# Patient Record
Sex: MALE | Race: WHITE | NOT HISPANIC OR LATINO | Employment: FULL TIME | URBAN - NONMETROPOLITAN AREA
[De-identification: names, ages, dates, MRNs, and addresses within clinical notes are randomized per-mention and may not be internally consistent; named-entity substitution may affect disease eponyms.]

---
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
## 2020-10-07 ENCOUNTER — VIRTUAL VISIT (OUTPATIENT)
Dept: FAMILY MEDICINE | Facility: OTHER | Age: 20
End: 2020-10-07
Attending: INTERNAL MEDICINE

## 2020-10-07 DIAGNOSIS — Z20.822 EXPOSURE TO COVID-19 VIRUS: Primary | ICD-10-CM

## 2020-10-07 PROCEDURE — 99212 OFFICE O/P EST SF 10 MIN: CPT | Mod: 95 | Performed by: PHYSICIAN ASSISTANT

## 2020-10-07 ASSESSMENT — PAIN SCALES - GENERAL: PAINLEVEL: NO PAIN (0)

## 2020-10-07 ASSESSMENT — PATIENT HEALTH QUESTIONNAIRE - PHQ9: SUM OF ALL RESPONSES TO PHQ QUESTIONS 1-9: 0

## 2020-10-07 NOTE — NURSING NOTE
"Chief Complaint   Patient presents with     Covid 19 Testing     exposure       Initial There were no vitals taken for this visit. Estimated body mass index is 43.07 kg/m  as calculated from the following:    Height as of 10/30/18: 1.745 m (5' 8.7\").    Weight as of 10/30/18: 131.1 kg (289 lb 1.6 oz).  Medication Reconciliation: complete    Mackenzie Salinas LPN  "

## 2020-10-07 NOTE — PROGRESS NOTES
"Myles Espinoza is a 20 year old male who is being evaluated via a billable telephone visit.      The patient has been notified of following:     \"This telephone visit will be conducted via a call between you and your physician/provider. We have found that certain health care needs can be provided without the need for a physical exam.  This service lets us provide the care you need with a short phone conversation.  If a prescription is necessary we can send it directly to your pharmacy.  If lab work is needed we can place an order for that and you can then stop by our lab to have the test done at a later time.    Telephone visits are billed at different rates depending on your insurance coverage. During this emergency period, for some insurers they may be billed the same as an in-person visit.  Please reach out to your insurance provider with any questions.    If during the course of the call the physician/provider feels a telephone visit is not appropriate, you will not be charged for this service.\"    Patient has given verbal consent for Telephone visit?  Yes    What phone number would you like to be contacted at? [phone]    How would you like to obtain your AVS? Mail a copy    Subjective     Myles Espinoza is a 20 year old male who presents via phone visit today for the following health issues:    HPI  Patient has a known exposure to person with positive test positive for COVID-19.  He denies any symptoms such as new cough, shortness of breath, fevers, chills, night sweats, new rash, or loss of taste or smell.  He does have history of pneumonia 2 years ago and states he has a mild cough that has persisted since then.  He states his cough is not changed at all.    Review of Systems   Constitutional, HEENT, cardiovascular, pulmonary, gi and gu systems are negative, except as otherwise noted.    Objective   Vitals - Patient Reported  Pain Score: No Pain (0)    healthy, alert and no distress  PSYCH: Alert and " oriented times 3; coherent speech, normal   rate and volume, able to articulate logical thoughts, able   to abstract reason, no tangential thoughts, no hallucinations   or delusions  His affect is normal  RESP: No cough, no audible wheezing, able to talk in full sentences  Remainder of exam unable to be completed due to telephone visits    Assessment & Plan     Myles was seen today for covid 19 testing.    Diagnoses and all orders for this visit:    Exposure to COVID-19 virus      He does have a tonic cough for the past 2 years that is unchanged.  If this does change I would like to see him back reevaluation possibly in the clinic if he is COVID-19 negative.  He understands this and will follow-up as needed.    No follow-ups on file.    ANIKET Altman  [facility]    Phone call duration:  5 minutes

## 2021-02-01 ENCOUNTER — OFFICE VISIT (OUTPATIENT)
Dept: FAMILY MEDICINE | Facility: OTHER | Age: 21
End: 2021-02-01
Attending: NURSE PRACTITIONER

## 2021-02-01 VITALS
SYSTOLIC BLOOD PRESSURE: 130 MMHG | RESPIRATION RATE: 18 BRPM | WEIGHT: 315 LBS | TEMPERATURE: 98.5 F | HEART RATE: 85 BPM | OXYGEN SATURATION: 97 % | DIASTOLIC BLOOD PRESSURE: 72 MMHG | BODY MASS INDEX: 48.03 KG/M2

## 2021-02-01 DIAGNOSIS — H00.021 HORDEOLUM INTERNUM OF RIGHT UPPER EYELID: Primary | ICD-10-CM

## 2021-02-01 DIAGNOSIS — H02.89 PAIN AND SWELLING OF UPPER EYELID OF RIGHT EYE: ICD-10-CM

## 2021-02-01 DIAGNOSIS — H02.841 PAIN AND SWELLING OF UPPER EYELID OF RIGHT EYE: ICD-10-CM

## 2021-02-01 PROCEDURE — 99213 OFFICE O/P EST LOW 20 MIN: CPT | Performed by: NURSE PRACTITIONER

## 2021-02-01 RX ORDER — ERYTHROMYCIN 5 MG/G
0.5 OINTMENT OPHTHALMIC 4 TIMES DAILY
Qty: 3.5 G | Refills: 0 | Status: SHIPPED | OUTPATIENT
Start: 2021-02-01 | End: 2022-05-21

## 2021-02-01 ASSESSMENT — PAIN SCALES - GENERAL: PAINLEVEL: NO PAIN (0)

## 2021-02-01 NOTE — NURSING NOTE
"Chief Complaint   Patient presents with     Eye Problem     right eye     Patient is here for swelling of the right eye that started 3 days ago. Patient states he had pain in it 3 days ago but no longer does.   VISION   Patient states he is supposed to wear glasses, not worn.  Tool used: Urrutia   Right eye:        10/32 (20/63)  Left eye:          10/25 (20/50)    Initial /72   Pulse 85   Temp 98.5  F (36.9  C) (Tympanic)   Resp 18   Wt 146.2 kg (322 lb 6.4 oz)   SpO2 97%   BMI 48.03 kg/m   Estimated body mass index is 48.03 kg/m  as calculated from the following:    Height as of 10/30/18: 1.745 m (5' 8.7\").    Weight as of this encounter: 146.2 kg (322 lb 6.4 oz).  Medication Reconciliation: complete    Marija Tavera LPN  "

## 2021-02-01 NOTE — PROGRESS NOTES
HPI:    Myles Espinoza is a 20 year old male  who presents to [facility] today for eyelid swelling    States he woke up 3 days ago with right upper eyelid swelling and pain.  No eyeball pain.  Yesterday burning in eye.  No irritated or scratchy feeling in eye.  No known foreign body.  Some watery drainage.  No crusting.  No vision change or blurry vision.  He is suppose to be wearing glasses but states he doesn't wear them because he lost them and hasn't replaced them.  Wears glasses at work.  No fevers or chills.  No runny or stuffy nose.  Headache the initial day, none since.  No cough.  No sore throat.  Tried hot compress this morning only.  Taking Ibuprofen 2 tabs every 6 hours.        Past Medical History:   Diagnosis Date     Anxiety disorder     anxiety NOS     Attention-deficit hyperactivity disorder     combined type,off medication as of .     Body mass index (BMI) of 40.0-44.9 in adult (H)     2015,BMI of 44.     Major depressive disorder, single episode     No Comments Provided     Prediabetes     2015     Pure hyperglyceridemia     2015     Vitamin D deficiency     2015     History reviewed. No pertinent surgical history.  Social History     Tobacco Use     Smoking status: Current Every Day Smoker     Packs/day: 0.25     Types: Cigarettes     Last attempt to quit: 2018     Years since quittin.3     Smokeless tobacco: Former User     Types: Chew     Tobacco comment: thinking about it   Substance Use Topics     Alcohol use: Yes     Alcohol/week: 0.0 standard drinks     Comment: rare     No current outpatient medications on file.     Allergies   Allergen Reactions     Liquid Adhesive Rash     Used with steri strips           Past medical history, past surgical history, current medications and allergies reviewed and accurate to the best of my knowledge.        ROS:  Refer to HPI    /72   Pulse 85   Temp 98.5  F (36.9  C) (Tympanic)   Resp 18   Wt 146.2 kg (322 lb  6.4 oz)   SpO2 97%   BMI 48.03 kg/m      EXAM:  General Appearance: Well appearing male adolescent, non ill appearance, appropriate appearance for age. No acute distress  Eyes: Left palpebral conjunctivae with bright erythema and localized swelling with scant purulent drainage from lateral upper eyelid, left bulbar conjunctivae normal without erythema or irritation, cornea clear, no hypopyon, no hyphema, no subconjunctival hemorrhage, no drainage or crusting.  Right upper lateral eyelid with mild erythema, tenderness and mild swelling, no lower eyelid swelling.  Right palpebral and bulbar conjunctivae without erythema or irritation, cornea clear, no drainage or crusting.  Left eyelid without swelling or erythema.  PERRLA.  NO periorbital tenderness to palpation.    Orophayrnx: voice clear.    Sinuses:  No sinus tenderness upon palpation of the frontal or maxillary sinuses  Nose:  No noted drainage or congestion   Respiratory: normal chest wall and respirations.  Normal effort.  No cough appreciated.  Musculoskeletal:  Equal movement of bilateral upper extremities.  Equal movement of bilateral lower extremities.  Normal gait.   Psychological: normal affect, alert, oriented, and pleasant.         ASSESSMENT/PLAN:    I have reviewed the nursing notes.  I have reviewed the findings, diagnosis, plan and need for follow up with the patient.    1. Pain and swelling of upper eyelid of right eye  2. Hordeolum internum of right upper eyelid    - erythromycin (ROMYCIN) 5 MG/GM ophthalmic ointment; Place 0.5 inches into the right eye 4 times daily  Dispense: 3.5 g; Refill: 0    Right lateral upper eyelid with visible erythema, swelling and internal stye with active purulent drainage.  No clinical indications of periorbital cellulitis.      Treat with warm compresses for 10 to 15 minutes 4 to 6 times daily and Erythromycin eye ointment QID.    Discussed warning signs/symptoms indicative of need to f/u  Follow up if symptoms  persist or worsen or concerns      I explained my diagnostic considerations and recommendations to the patient, who voiced understanding and agreement with the treatment plan. All questions were answered. We discussed potential side effects of any prescribed or recommended therapies, as well as expectations for response to treatments.    Disclaimer:  This note consists of words and symbols derived from keyboarding, dictation, or using voice recognition software. As a result, there may be errors in the script that have gone undetected. Please consider this when interpreting information found in this note.

## 2022-05-21 ENCOUNTER — OFFICE VISIT (OUTPATIENT)
Dept: FAMILY MEDICINE | Facility: OTHER | Age: 22
End: 2022-05-21
Attending: NURSE PRACTITIONER

## 2022-05-21 ENCOUNTER — TELEPHONE (OUTPATIENT)
Dept: FAMILY MEDICINE | Facility: OTHER | Age: 22
End: 2022-05-21

## 2022-05-21 VITALS
HEART RATE: 99 BPM | TEMPERATURE: 97.9 F | BODY MASS INDEX: 47.09 KG/M2 | SYSTOLIC BLOOD PRESSURE: 132 MMHG | WEIGHT: 315 LBS | RESPIRATION RATE: 18 BRPM | DIASTOLIC BLOOD PRESSURE: 80 MMHG | OXYGEN SATURATION: 97 %

## 2022-05-21 DIAGNOSIS — R05.9 COUGH: ICD-10-CM

## 2022-05-21 DIAGNOSIS — R52 BODY ACHES: ICD-10-CM

## 2022-05-21 DIAGNOSIS — U07.1 INFECTION DUE TO 2019 NOVEL CORONAVIRUS: Primary | ICD-10-CM

## 2022-05-21 LAB — SARS-COV-2 RNA RESP QL NAA+PROBE: POSITIVE

## 2022-05-21 PROCEDURE — C9803 HOPD COVID-19 SPEC COLLECT: HCPCS

## 2022-05-21 PROCEDURE — U0005 INFEC AGEN DETEC AMPLI PROBE: HCPCS | Mod: ZL | Performed by: PHYSICIAN ASSISTANT

## 2022-05-21 PROCEDURE — 99213 OFFICE O/P EST LOW 20 MIN: CPT | Performed by: PHYSICIAN ASSISTANT

## 2022-05-21 ASSESSMENT — PAIN SCALES - GENERAL: PAINLEVEL: MODERATE PAIN (5)

## 2022-05-21 NOTE — PROGRESS NOTES
"ASSESSMENT/PLAN:     I have reviewed the nursing notes.  I have reviewed the findings, diagnosis, plan and need for follow up with the patient.    22 yo male with headache, body aches, fever with symptoms about 24 hours. Positive home covid test. Wanted clinic test to verify. Discussed symptomatic treatments and quarantine per CDC guidelines. Return to clinic for difficulty breathing.     1. Infection due to 2019 novel coronavirus    2. Headache  3. Cough  4. Body aches    - Symptomatic; Unknown COVID-19 Virus (Coronavirus) by PCR Nose        I explained my diagnostic considerations and recommendations to the patient, who voiced understanding and agreement with the treatment plan. All questions were answered. We discussed potential side effects of any prescribed or recommended therapies, as well as expectations for response to treatments.    Rolanda Martinez PA-C  [facility]          Nursing Notes:   Kellie Haddad LPN  2022  1:04 PM  Signed  Chief Complaint   Patient presents with     Fever     Fever, body aches, diarrhea, headache, fatigue     Patient stated in room that he took a Covid home test yesterday tht was positive, but states that it was .    Initial /80 (BP Location: Right arm, Patient Position: Sitting, Cuff Size: Adult Large)   Pulse 99   Temp 97.9  F (36.6  C) (Tympanic)   Resp 18   Wt 143.4 kg (316 lb 1.6 oz)   SpO2 97%   BMI 47.09 kg/m   Estimated body mass index is 47.09 kg/m  as calculated from the following:    Height as of 10/30/18: 1.745 m (5' 8.7\").    Weight as of this encounter: 143.4 kg (316 lb 1.6 oz).     Medication Reconciliation: complete      FOOD SECURITY SCREENING QUESTIONS:    The next two questions are to help us understand your food security.  If you are feeling you need any assistance in this area, we have resources available to support you today.    Hunger Vital Signs:  Within the past 12 months we worried whether our food would run out " before we got money to buy more. Never  Within the past 12 months the food we bought just didn't last and we didn't have money to get more. Never      Advance care plan reviewed      Kellie Haddad LPN on 5/21/2022 at 1:00 PM           SUBJECTIVE:   Myles Espinoza is a 21 year old male who presents to clinic today for evaluation of headache, body aches, fever  Onset: past 24 hours  Course unchanged  Associated symptoms mild occasional cough, headache, body aches, fever, diarrhea  Treatments: tylenol     Denies:  chest pain, shortness of breath, wheezing    Eating and drinking normal  Normal urine   No skin rash     No prior history of asthma/COPD  He had a prior pneumonia a few years ago  Current smoker - 1 ppd  Exposures: not known        Past Medical History:   Diagnosis Date     Anxiety disorder     anxiety NOS     Attention-deficit hyperactivity disorder     combined type,off medication as of 2013.     Body mass index (BMI) of 40.0-44.9 in adult (H)     9/9/2015,BMI of 44.     Major depressive disorder, single episode     No Comments Provided     Prediabetes     9/18/2015     Pure hyperglyceridemia     9/18/2015     Vitamin D deficiency     9/18/2015     History reviewed. No pertinent surgical history.  Social History     Tobacco Use     Smoking status: Current Every Day Smoker     Packs/day: 0.25     Types: Cigarettes     Last attempt to quit: 9/22/2018     Years since quitting: 3.6     Smokeless tobacco: Former User     Types: Chew     Tobacco comment: thinking about it   Substance Use Topics     Alcohol use: Yes     Alcohol/week: 0.0 standard drinks     Comment: rare     Current Outpatient Medications   Medication Sig Dispense Refill     erythromycin (ROMYCIN) 5 MG/GM ophthalmic ointment Place 0.5 inches into the right eye 4 times daily 3.5 g 0     Allergies   Allergen Reactions     Liquid Adhesive Rash     Used with steri strips           Past medical history, past surgical history, current medications and  allergies reviewed and accurate to the best of my knowledge.          OBJECTIVE:     /80 (BP Location: Right arm, Patient Position: Sitting, Cuff Size: Adult Large)   Pulse 99   Temp 97.9  F (36.6  C) (Tympanic)   Resp 18   Wt 143.4 kg (316 lb 1.6 oz)   SpO2 97%   BMI 47.09 kg/m    Body mass index is 47.09 kg/m .    General Appearance: Well appearing male,  No acute distress  Eyes: no injection, no tearing or drainage, eye lids normal  Ears: Canals and TM's normal   Orophayrnx: moist mucous membranes,   Nose: No sinus tendernesss  Neck: supple without adenopathy, no rigidity, normal ROM  Respiratory: normal respiration, no increased work of breathing, no stridor/retractions/nasal flaring. Lungs Clear to auscultation  Cardiac: RRR with no murmurs  Legs: no swelling or tenderness  Musculoskeletal:  Equal movement of bilateral upper extremities.  Equal movement of bilateral lower extremities.  Normal gait.   Dermatological: no rashes noted of exposed skin  Psychological: normal affect, alert, oriented, and pleasant.       Results for orders placed or performed in visit on 05/21/22   Symptomatic; Unknown COVID-19 Virus (Coronavirus) by PCR Nose     Status: Abnormal    Specimen: Nose; Swab   Result Value Ref Range    SARS CoV2 PCR Positive (A) Negative    Narrative    Testing was performed using the Xpert Xpress SARS-CoV-2 Assay on the   Cepheid Gene-Xpert Instrument Systems. Additional information about   this Emergency Use Authorization (EUA) assay can be found via the Lab   Guide. This test should be ordered for the detection of SARS-CoV-2 in   individuals who meet SARS-CoV-2 clinical and/or epidemiological   criteria. Test performance is unknown in asymptomatic patients. This   test is for in vitro diagnostic use under the FDA EUA for   laboratories certified under CLIA to perform high complexity testing.   This test has not been FDA cleared or approved. A negative result   does not rule out the presence  of PCR inhibitors in the specimen or   target RNA in concentration below the limit of detection for the   assay. The possibility of a false negative should be considered if   the patient's recent exposure or clinical presentation suggests   COVID-19. This test was validated by [facility]. This laboratory is certified under the [facility]  al Laboratory Improvement Amendments (CLIA) as qualified to perform high complex  ity clinical laboratory testing.

## 2022-05-21 NOTE — PATIENT INSTRUCTIONS
Suspect covid  Symptomatic treatments: rest, tylenol, ibuprofen, cough and cold medications as needed  Quarantine per CDC guidelines recommended    Return to clinic if symptoms persist/worsen or difficulty breathing

## 2022-05-21 NOTE — NURSING NOTE
"Chief Complaint   Patient presents with     Fever     Fever, body aches, diarrhea, headache, fatigue     Patient stated in room that he took a Covid home test yesterday tht was positive, but states that it was .    Initial /80 (BP Location: Right arm, Patient Position: Sitting, Cuff Size: Adult Large)   Pulse 99   Temp 97.9  F (36.6  C) (Tympanic)   Resp 18   Wt 143.4 kg (316 lb 1.6 oz)   SpO2 97%   BMI 47.09 kg/m   Estimated body mass index is 47.09 kg/m  as calculated from the following:    Height as of 10/30/18: 1.745 m (5' 8.7\").    Weight as of this encounter: 143.4 kg (316 lb 1.6 oz).     Medication Reconciliation: complete      FOOD SECURITY SCREENING QUESTIONS:    The next two questions are to help us understand your food security.  If you are feeling you need any assistance in this area, we have resources available to support you today.    Hunger Vital Signs:  Within the past 12 months we worried whether our food would run out before we got money to buy more. Never  Within the past 12 months the food we bought just didn't last and we didn't have money to get more. Never      Advance care plan reviewed      Kellie Haddad LPN on 2022 at 1:00 PM      "

## 2022-05-22 NOTE — TELEPHONE ENCOUNTER
Patient's mom wanted [facility] to get the medication for Covid Antibody.    Please call patient back.    Thank you,  Lisette Do on 5/21/2022 at 7:44 PM

## 2022-05-22 NOTE — TELEPHONE ENCOUNTER
Patient is requesting Paxlovid for covid 19. Patient has 2 risk factors: smoking and obesity. He has no medications.   Reviewed risks/benefits.  Patient would like to proceed. Informed him he would have to wear condoms for 3 months after treatment.  Medication sent to [facility]. Return to clinic if symptoms persist/worsen.     Rolanda Martinez PA-C

## 2022-09-26 ENCOUNTER — OFFICE VISIT (OUTPATIENT)
Dept: FAMILY MEDICINE | Facility: OTHER | Age: 22
End: 2022-09-26
Attending: PHYSICIAN ASSISTANT
Payer: COMMERCIAL

## 2022-09-26 VITALS
HEART RATE: 80 BPM | DIASTOLIC BLOOD PRESSURE: 88 MMHG | SYSTOLIC BLOOD PRESSURE: 152 MMHG | OXYGEN SATURATION: 96 % | RESPIRATION RATE: 16 BRPM | BODY MASS INDEX: 46.92 KG/M2 | TEMPERATURE: 98.2 F | WEIGHT: 315 LBS

## 2022-09-26 DIAGNOSIS — J01.00 SUBACUTE MAXILLARY SINUSITIS: Primary | ICD-10-CM

## 2022-09-26 DIAGNOSIS — H65.93 BILATERAL NON-SUPPURATIVE OTITIS MEDIA: ICD-10-CM

## 2022-09-26 PROCEDURE — 99213 OFFICE O/P EST LOW 20 MIN: CPT | Performed by: PHYSICIAN ASSISTANT

## 2022-09-26 ASSESSMENT — PAIN SCALES - GENERAL: PAINLEVEL: MODERATE PAIN (5)

## 2022-09-26 NOTE — PATIENT INSTRUCTIONS
You were prescribed an antibiotic, please take into consideration the following information:  - Take entire course of antibiotic even if you start to feel better.  - Antibiotics can cause stomach upset including nausea and diarrhea. Read your bottle or ask the pharmacist if antibiotic can be taken with food to help prevent nausea. If you have symptoms of diarrhea you can take an over-the-counter probiotic and/or increase foods with probiotics such as yogurt, [place], sauerkraut.  -Use caution in sunlight as can lead to increased risk of sunburn while on ABX (antibiotics).     Please refer to your AVS for follow up and pain/symptoms management recommendations (I.e.: medications, helpful conservative treatment modalities, appropriate follow up if need to a specialist or family practice, etc.). Please return to urgent care if your symptoms change or worsen.     Discharge instructions:  -If you were prescribed a medication(s), please take this as prescribed/directed  -Monitor your symptoms, if changing/worsening, return to UC/ER or PCP for follow up    Sinus Infection:   1. Dry out congestion with flonase (1spray in both nostrils 2x daily for 3-5 days) and pseudoephedrine (1-2 tabs every 4-6 hrs for 3-5 days) unless contraindicated     2. Antihistamine such as Claritin or Zyrtec, etc. Generic brands are OK as well.     3. Use a saline spray/Neti Pot/sinus flush (Richard Med Sinus Rinse) 2-3 times daily to irrigate sinuses/mucosal tissue. This dilutes and moves secretions.     4. Tylenol or ibuprofen for pain and fevers - alternate every 4 hours as needed. I.e.: Ibuprofen at 8am, Tylenol 12pm, Ibuprofen 4pm    -Daily maximum of Tylenol is 4000mg (recommend staying under 3000mg)   -Daily maximum of Ibuprofen is 3200 mg    5. Plenty of fluids and rest as needed.     6. Chew, yawn and speak to help eustachian tubes drain.     * If you are a smoker, try to quit *     - Consider the following over-the-counter products if you are  older than 1 year and not pregnant: honey/chestal for cough relief and sambucus/elderberry for viral upper-respiratory symptoms.

## 2022-09-26 NOTE — PROGRESS NOTES
ASSESSMENT/PLAN:    I have reviewed the nursing notes.  I have reviewed the findings, diagnosis, plan and need for follow up with the patient.    1. Subacute maxillary sinusitis  - amoxicillin-clavulanate (AUGMENTIN) 875-125 MG tablet; Take 1 tablet by mouth 2 times daily for 10 days  Dispense: 20 tablet; Refill: 0  - Vital signs stable. PE consistent with sinusitis. Discussed with patient that we recommend: to dry out congestion with flonase (1spray in both nostrils 2x daily for 3-5 days) and pseudoephedrine (1-2 tabs every 4-6 hrs for 3-5 days) unless contraindicated, use a saline spray/Neti Pot/sinus flush (Richard Med Sinus Rinse) 2-3 times daily to irrigate sinuses/mucosal tissue. This dilutes and moves secretions. Alternate Tylenol or ibuprofen for pain and fevers - alternate every 4 hours as needed. Recommend plenty of fluids and rest as needed. Discussed that if a smoker, tobacco cessation recommended. Discussed that we normally do not treat sinus infections of less than 10 days duration with oral antibiotics as these are typically viral in nature. Discussed that if an antibiotic was prescribed today for bacterial sinusitis to take the entire course of antibiotic, discussed side effect profile of prescribed medications. Patient is in agreement and understanding of the above treatment plan. All questions and concerns were addressed and answered to patient's satisfaction. AVS reviewed with patient.     2. Bilateral non-suppurative otitis media  - amoxicillin-clavulanate (AUGMENTIN) 875-125 MG tablet; Take 1 tablet by mouth 2 times daily for 10 days  Dispense: 20 tablet; Refill: 0  -  PE consistent with OME/ear infection of bilateral ear(s). Treatment of choice includes antibiotic regimen (Augmentin, alternating tylenol and ibuprofen every 4-6 hours as needed (if able, daily limits reviewed in AVS and verbally with patient), warm compresses, other symptomatic remedies. Avoid trauma to ear(s) such as Q-tips. If  symptoms change or worsen, recommend follow up for reevaluation (high fevers, worsening pain, abnormal drainage or odor from ear, etc.).     Discussed warning signs/symptoms indicative of need to f/u    Follow up if symptoms persist or worsen or concerns    I explained my diagnostic considerations and recommendations to the patient, who voiced understanding and agreement with the treatment plan. All questions were answered. We discussed potential side effects of any prescribed or recommended therapies, as well as expectations for response to treatments.    Chelsea Skinner PA-C  9/26/2022  1:53 PM    HPI:    Myles Espinoza is a 22 year old male  who presents to [facility] today for concerns of URI, x 10 days    Symptoms:  No fevers or chills.  No sore throat/pharyngitis/tonsillitis.   Yes allergy/URI Symptoms  Yes Muffled Sounds/Change in Hearing - left sided  Yes Sensation of Fullness in Ear(s) - left sided  No Ringing in Ears/Tinnitus  No Balance Changes  No Dizziness  Yes Congestion (head/nasal/chest)  Yes Cough/Productive Cough  Yes Post Nasal Drip  Yes Headache  Yes Sinus Pain/Pressure  No Myalgias  Yes Otalgia  Activity Level Changes: Yes: tired  Appetite/Liquid Intake Changes: No  Changes to Bowel Habits: Yes: diarrhea  Changes to Bladder Habits: No    Treatments tried: OTC Cough med, Fluids and Rest. Dayquil no relief. Nyquil has helped with sleep.     Site of exposure: not known.  Type of exposure: not known    Vaccination status:   - Influenza: not immunized at this time  - COVID: not immunized at this time    Other Pertinent History: tobacco use - has cut down due to illness, prior 1/2-1 ppd    PCP: [facility]    Past Medical History:   Diagnosis Date     Anxiety disorder     anxiety NOS     Attention-deficit hyperactivity disorder     combined type,off medication as of 2013.     Body mass index (BMI) of 40.0-44.9 in adult (H)     9/9/2015,BMI of 44.     Major depressive disorder, single episode      No Comments Provided     Prediabetes     2015     Pure hyperglyceridemia     2015     Vitamin D deficiency     2015     History reviewed. No pertinent surgical history.  Social History     Tobacco Use     Smoking status: Current Every Day Smoker     Packs/day: 0.50     Types: Cigarettes     Last attempt to quit: 2018     Years since quittin.0     Smokeless tobacco: Former User     Types: Chew     Tobacco comment: thinking about it - has tried to stop   Substance Use Topics     Alcohol use: Yes     Alcohol/week: 0.0 standard drinks     Comment: rare     No current outpatient medications on file.     Allergies   Allergen Reactions     Liquid Adhesive Rash     Used with steri strips       Past medical history, past surgical history, current medications and allergies reviewed and accurate to the best of my knowledge.      ROS:  Refer to HPI    BP (!) 152/88 (BP Location: Right arm, Patient Position: Sitting, Cuff Size: Adult Large)   Pulse 80   Temp 98.2  F (36.8  C) (Tympanic)   Resp 16   Wt 142.9 kg (315 lb)   SpO2 96%   BMI 46.92 kg/m      EXAM:  General Appearance: Well appearing 22 year old male, appropriate appearance for age. No acute distress   Ears: Bilateral TM intact, erythematous, bulging, serous effusion.  Left auditory canal clear.  Right auditory canal clear.  Normal external ears, non tender.  Eyes: conjunctivae normal without erythema or irritation, corneas clear, no drainage or crusting, no eyelid swelling, pupils equal   Oropharynx: moist mucous membranes, posterior pharynx without erythema, tonsils symmetric, no erythema, no exudates or petechiae, no post nasal drip seen, no trismus, voice clear.    Sinuses:  Maxillary sinus tenderness and pressure bilaterally  Nose:  Bilateral nares: no erythema, no edema, no drainage or congestion   Neck: supple without adenopathy  Respiratory: normal chest wall and respirations.  Normal effort.  Clear to auscultation bilaterally, no  wheezing, crackles or rhonchi.  No increased work of breathing.  No cough appreciated.  Cardiac: RRR with no murmurs  Dermatological: no rashes noted of exposed skin  Psychological: normal affect, alert, oriented, and pleasant.     Labs:  None     Xray:  None

## 2022-09-26 NOTE — NURSING NOTE
Chief Complaint   Patient presents with     Nasal Congestion     Throat Problem     Cough     Headache     Medication Review Completed: complete    FOOD SECURITY SCREENING QUESTIONS:    The next two questions are to help us understand your food security.  If you are feeling you need any assistance in this area, we have resources available to support you today.    Hunger Vital Signs:  Within the past 12 months we worried whether our food would run out before we got money to buy more. Never  Within the past 12 months the food we bought just didn't last and we didn't have money to get more. Never    Fanny Cotton LPN

## 2024-08-06 ENCOUNTER — OFFICE VISIT (OUTPATIENT)
Dept: FAMILY MEDICINE | Facility: OTHER | Age: 24
End: 2024-08-06
Attending: PHYSICIAN ASSISTANT
Payer: COMMERCIAL

## 2024-08-06 VITALS
BODY MASS INDEX: 35.22 KG/M2 | WEIGHT: 246 LBS | HEART RATE: 88 BPM | HEIGHT: 70 IN | DIASTOLIC BLOOD PRESSURE: 98 MMHG | SYSTOLIC BLOOD PRESSURE: 168 MMHG | TEMPERATURE: 98.7 F | RESPIRATION RATE: 20 BRPM

## 2024-08-06 DIAGNOSIS — R79.89 ELEVATED LFTS: ICD-10-CM

## 2024-08-06 DIAGNOSIS — Z13.220 LIPID SCREENING: ICD-10-CM

## 2024-08-06 DIAGNOSIS — E66.1 CLASS 2 DRUG-INDUCED OBESITY WITH SERIOUS COMORBIDITY AND BODY MASS INDEX (BMI) OF 35.0 TO 35.9 IN ADULT: ICD-10-CM

## 2024-08-06 DIAGNOSIS — I10 ESSENTIAL HYPERTENSION: Primary | ICD-10-CM

## 2024-08-06 DIAGNOSIS — R73.03 PREDIABETES: ICD-10-CM

## 2024-08-06 DIAGNOSIS — E66.812 CLASS 2 DRUG-INDUCED OBESITY WITH SERIOUS COMORBIDITY AND BODY MASS INDEX (BMI) OF 35.0 TO 35.9 IN ADULT: ICD-10-CM

## 2024-08-06 LAB
ALBUMIN SERPL BCG-MCNC: 4.1 G/DL (ref 3.5–5.2)
ALP SERPL-CCNC: 58 U/L (ref 40–150)
ALT SERPL W P-5'-P-CCNC: 77 U/L (ref 0–70)
ANION GAP SERPL CALCULATED.3IONS-SCNC: 9 MMOL/L (ref 7–15)
AST SERPL W P-5'-P-CCNC: 51 U/L (ref 0–45)
ATRIAL RATE - MUSE: 83 BPM
BASOPHILS # BLD AUTO: 0 10E3/UL (ref 0–0.2)
BASOPHILS NFR BLD AUTO: 0 %
BILIRUB SERPL-MCNC: 0.2 MG/DL
BUN SERPL-MCNC: 14.9 MG/DL (ref 6–20)
CALCIUM SERPL-MCNC: 9.7 MG/DL (ref 8.8–10.4)
CHLORIDE SERPL-SCNC: 105 MMOL/L (ref 98–107)
CHOLEST SERPL-MCNC: 166 MG/DL
CREAT SERPL-MCNC: 0.95 MG/DL (ref 0.67–1.17)
DIASTOLIC BLOOD PRESSURE - MUSE: NORMAL MMHG
EGFRCR SERPLBLD CKD-EPI 2021: >90 ML/MIN/1.73M2
EOSINOPHIL # BLD AUTO: 0.2 10E3/UL (ref 0–0.7)
EOSINOPHIL NFR BLD AUTO: 2 %
ERYTHROCYTE [DISTWIDTH] IN BLOOD BY AUTOMATED COUNT: 12.3 % (ref 10–15)
GLUCOSE SERPL-MCNC: 127 MG/DL (ref 70–99)
HBA1C MFR BLD: 6 % (ref 4–6.2)
HCO3 SERPL-SCNC: 25 MMOL/L (ref 22–29)
HCT VFR BLD AUTO: 47.7 % (ref 40–53)
HDLC SERPL-MCNC: 38 MG/DL
HGB BLD-MCNC: 16.1 G/DL (ref 13.3–17.7)
IMM GRANULOCYTES # BLD: 0.1 10E3/UL
IMM GRANULOCYTES NFR BLD: 1 %
INTERPRETATION ECG - MUSE: NORMAL
LDLC SERPL CALC-MCNC: 75 MG/DL
LYMPHOCYTES # BLD AUTO: 3.8 10E3/UL (ref 0.8–5.3)
LYMPHOCYTES NFR BLD AUTO: 31 %
MCH RBC QN AUTO: 28.7 PG (ref 26.5–33)
MCHC RBC AUTO-ENTMCNC: 33.8 G/DL (ref 31.5–36.5)
MCV RBC AUTO: 85 FL (ref 78–100)
MONOCYTES # BLD AUTO: 0.9 10E3/UL (ref 0–1.3)
MONOCYTES NFR BLD AUTO: 7 %
NEUTROPHILS # BLD AUTO: 7 10E3/UL (ref 1.6–8.3)
NEUTROPHILS NFR BLD AUTO: 59 %
NONHDLC SERPL-MCNC: 128 MG/DL
NRBC # BLD AUTO: 0 10E3/UL
NRBC BLD AUTO-RTO: 0 /100
P AXIS - MUSE: 46 DEGREES
PLATELET # BLD AUTO: 276 10E3/UL (ref 150–450)
POTASSIUM SERPL-SCNC: 3.8 MMOL/L (ref 3.4–5.3)
PR INTERVAL - MUSE: 136 MS
PROT SERPL-MCNC: 7.2 G/DL (ref 6.4–8.3)
QRS DURATION - MUSE: 86 MS
QT - MUSE: 370 MS
QTC - MUSE: 434 MS
R AXIS - MUSE: 52 DEGREES
RBC # BLD AUTO: 5.61 10E6/UL (ref 4.4–5.9)
SODIUM SERPL-SCNC: 139 MMOL/L (ref 135–145)
SYSTOLIC BLOOD PRESSURE - MUSE: NORMAL MMHG
T AXIS - MUSE: 38 DEGREES
TRIGL SERPL-MCNC: 267 MG/DL
TSH SERPL DL<=0.005 MIU/L-ACNC: 1.06 UIU/ML (ref 0.3–4.2)
VENTRICULAR RATE- MUSE: 83 BPM
WBC # BLD AUTO: 11.9 10E3/UL (ref 4–11)

## 2024-08-06 PROCEDURE — 80061 LIPID PANEL: CPT | Mod: ZL | Performed by: PHYSICIAN ASSISTANT

## 2024-08-06 PROCEDURE — 84443 ASSAY THYROID STIM HORMONE: CPT | Mod: ZL | Performed by: PHYSICIAN ASSISTANT

## 2024-08-06 PROCEDURE — 85041 AUTOMATED RBC COUNT: CPT | Mod: ZL | Performed by: PHYSICIAN ASSISTANT

## 2024-08-06 PROCEDURE — 82374 ASSAY BLOOD CARBON DIOXIDE: CPT | Mod: ZL | Performed by: PHYSICIAN ASSISTANT

## 2024-08-06 PROCEDURE — 93000 ELECTROCARDIOGRAM COMPLETE: CPT | Performed by: INTERNAL MEDICINE

## 2024-08-06 PROCEDURE — 83036 HEMOGLOBIN GLYCOSYLATED A1C: CPT | Mod: ZL | Performed by: PHYSICIAN ASSISTANT

## 2024-08-06 PROCEDURE — 99214 OFFICE O/P EST MOD 30 MIN: CPT | Performed by: PHYSICIAN ASSISTANT

## 2024-08-06 PROCEDURE — 36415 COLL VENOUS BLD VENIPUNCTURE: CPT | Mod: ZL | Performed by: PHYSICIAN ASSISTANT

## 2024-08-06 RX ORDER — LOSARTAN POTASSIUM AND HYDROCHLOROTHIAZIDE 12.5; 5 MG/1; MG/1
1 TABLET ORAL DAILY
Qty: 30 TABLET | Refills: 1 | Status: SHIPPED | OUTPATIENT
Start: 2024-08-06

## 2024-08-06 ASSESSMENT — PAIN SCALES - GENERAL: PAINLEVEL: NO PAIN (0)

## 2024-08-06 NOTE — PROGRESS NOTES
Assessment & Plan       ICD-10-CM    1. Essential hypertension  I10 CBC and Differential     Comprehensive Metabolic Panel     TSH Reflex GH     EKG 12-lead, tracing only (Same Day)     Hemoglobin A1c     losartan-hydrochlorothiazide (HYZAAR) 50-12.5 MG tablet      2. Lipid screening  Z13.220 Lipid Panel      3. Class 2 drug-induced obesity with serious comorbidity and body mass index (BMI) of 35.0 to 35.9 in adult  E66.1     Z68.35       4. Elevated LFTs  R79.89 Comprehensive Metabolic Panel      5. Prediabetes  R73.03         Essential hypertension, initial blood pressure reading of 178/98, repeat blood pressure 168/98, these are both quite above goal.  Discussed tobacco cessation, lifestyle modifications including increased lean proteins, fruits, vegetables and goal exercise of 150 minutes of moderate exercise per week.  Weight loss would greatly benefit cardiopulmonary health.  Lab work was updated today: WBC count of 11.9, attributed to tobacco use.  Remainder of blood counts normal, hemoglobin 16.1 and hematocrit 47.7.  Nonfasting glucose 127, GFR 90, creatinine 0.95, potassium 3.8, LFTs elevated - see #4.  TSH normal 1.06.  A1c elevated at 6.0, indicating prediabetes. Recommend dual agent therapy due to degree of blood pressure elevation.  Hyzaar 50-12.5 mg daily was sent through to pharmacy. Discussed risks, benefits and side effects of medication at length with Myles. Recheck in 4-weeks. Recommend to recheck potassium and renal function at this time, along with hepatic function as outlined in #4.  Recommend to check blood pressures at home, reviewed recommendations and how to properly check blood pressure (relaxation, arms and legs uncrossed, feet flat on the floor, avoiding caffeine and tobacco intake within 30 to 60 minutes of checking blood pressure, etc.).  Non fasting lipid screening ordered today due to new diagnosis of prediabetes - cholesterol 166, triglycerides 267, HDL 38, LDL 75. Overall,  "other than triglyceride elevation and slight low HDL (exercise and weight loss first line), this is unremarkable. Recommend lifestyle modifications.   Class II obesity, as noted above, weight loss and lifestyle modifications would greatly benefit prediabetes and cardiopulmonary health.  If A1c is upward trend, would recommend further interventions.  LFTs elevated with AST of 51 and ALT 77, recommend recheck in 1 to 2 months.  If persistently high, may benefit from additional laboratory testing including hepatitis panel, viral testing panel (CMV, EBV, etc.) and ultrasound of right upper quadrant.  Prediabetes - as noted above, lifestyle modifications are first line goal/treatment. Recommend increased lean proteins, fruits and vegetable intake. Goal exercise 150 minutes of moderate exercise per week (walking is great exercise). Weight loss would benefit cardiopulmonary and overall health.     Nicotine/Tobacco Cessation  He reports that he has been smoking cigarettes. He has quit using smokeless tobacco.  His smokeless tobacco use included chew.  Nicotine/Tobacco Cessation Plan  Information offered: Patient not interested at this time    BMI  Estimated body mass index is 35.81 kg/m  as calculated from the following:    Height as of this encounter: 1.765 m (5' 9.5\").    Weight as of this encounter: 111.6 kg (246 lb).   Weight management plan: Discussed healthy diet and exercise guidelines    See Patient Instructions    Return in about 4 weeks (around 9/3/2024) for Medication follow up.    Nori Bueno is a 24 year old, presenting for the following health issues:  Hypertension and Swelling        8/6/2024     3:15 PM   Additional Questions   Roomed by donte black lpn   Accompanied by s/o     History of Present Illness       Reason for visit:  Swelling and havent been to a dr appoinment in years.    He eats 0-1 servings of fruits and vegetables daily.He consumes 4 sweetened beverage(s) daily.He exercises with enough " effort to increase his heart rate 60 or more minutes per day.  He exercises with enough effort to increase his heart rate 5 days per week.   He is taking medications regularly.     Myles presents to the clinic today with his girlfriend, Maria Luz, they are Co[employer] historians for visit today.  Over the last few years, Myles has had off-and-on readings of elevated blood pressures.  Notes he was previously prescribed losartan in [id number] -took this sporadically.  Declines chest pain, shortness of breath, centralized edema, headaches and or vision changes.  He does have bilateral ankle swelling for the last month, right slightly greater than left.  Unsure if there is any changes based off heat, humidity, activity level, etc.  Declines any pain or pigmentation changes to lower extremities.  He smokes approximately 0.5-1 packs per day of cigarettes, does intermittently vape (nicotine) as well.  Started smoking at age 16, not interested in cessation.  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  Blood pressure was noted to be elevated.  He did have a negative urinalysis for proteinuria per report.  He does endorse a family history of hypertension in mother and father as well as grandparents.  Mother with type 2 diabetes, maternal grandmother with new onset diagnosis of type 2 diabetes as well.  Father with a previous thyroidectomy (unclear etiology - hyperthyroidism, etc., Myles is unsure if father is on levothyroxine secondary to this).    History of pulmonary embolism of RLL in 2017. + coartication of aorta. Negative clotting panel (factor II, V, etc.), MIRTHA negative. + Coumadin in [id number].     Personal history - works in [employer] field for [employer]. Living with girlfriend, Maria Luz. Good relationships with family and friends.     Review of Systems  Constitutional, HEENT, cardiovascular, pulmonary, GI, , musculoskeletal, neuro, skin, endocrine and psych  "systems are negative, except as otherwise noted.      Objective    BP (!) 168/98 (BP Location: Left arm, Patient Position: Sitting, Cuff Size: Adult Large)   Pulse 88   Temp 98.7  F (37.1  C) (Tympanic)   Resp 20   Ht 1.765 m (5' 9.5\")   Wt 111.6 kg (246 lb)   BMI 35.81 kg/m    Body mass index is 35.81 kg/m .  Physical Exam   GENERAL: alert and no distress  EYES: Eyes grossly normal to inspection  RESP: lungs clear to auscultation - no rales, rhonchi or wheezes  CV: regular rate and rhythm, normal S1 S2, no S3 or S4, no murmur, click or rub, no peripheral edema  MS: no gross musculoskeletal defects noted, no edema  SKIN: no suspicious lesions or rashes  NEURO: Normal strength and tone, mentation intact and speech normal  PSYCH: mentation appears normal, affect normal/bright    Results for orders placed or performed in visit on 08/06/24   Comprehensive Metabolic Panel     Status: Abnormal   Result Value Ref Range    Sodium 139 135 - 145 mmol/L    Potassium 3.8 3.4 - 5.3 mmol/L    Carbon Dioxide (CO2) 25 22 - 29 mmol/L    Anion Gap 9 7 - 15 mmol/L    Urea Nitrogen 14.9 6.0 - 20.0 mg/dL    Creatinine 0.95 0.67 - 1.17 mg/dL    GFR Estimate >90 >60 mL/min/1.73m2    Calcium 9.7 8.8 - 10.4 mg/dL    Chloride 105 98 - 107 mmol/L    Glucose 127 (H) 70 - 99 mg/dL    Alkaline Phosphatase 58 40 - 150 U/L    AST 51 (H) 0 - 45 U/L    ALT 77 (H) 0 - 70 U/L    Protein Total 7.2 6.4 - 8.3 g/dL    Albumin 4.1 3.5 - 5.2 g/dL    Bilirubin Total 0.2 <=1.2 mg/dL   TSH Reflex GH     Status: Normal   Result Value Ref Range    TSH 1.06 0.30 - 4.20 uIU/mL   Hemoglobin A1c     Status: Normal   Result Value Ref Range    Hemoglobin A1C 6.0 4.0 - 6.2 %   CBC with platelets and differential     Status: Abnormal   Result Value Ref Range    WBC Count 11.9 (H) 4.0 - 11.0 10e3/uL    RBC Count 5.61 4.40 - 5.90 10e6/uL    Hemoglobin 16.1 13.3 - 17.7 g/dL    Hematocrit 47.7 40.0 - 53.0 %    MCV 85 78 - 100 fL    MCH 28.7 26.5 - 33.0 pg    MCHC 33.8 " 31.5 - 36.5 g/dL    RDW 12.3 10.0 - 15.0 %    Platelet Count 276 150 - 450 10e3/uL    % Neutrophils 59 %    % Lymphocytes 31 %    % Monocytes 7 %    % Eosinophils 2 %    % Basophils 0 %    % Immature Granulocytes 1 %    NRBCs per 100 WBC 0 <1 /100    Absolute Neutrophils 7.0 1.6 - 8.3 10e3/uL    Absolute Lymphocytes 3.8 0.8 - 5.3 10e3/uL    Absolute Monocytes 0.9 0.0 - 1.3 10e3/uL    Absolute Eosinophils 0.2 0.0 - 0.7 10e3/uL    Absolute Basophils 0.0 0.0 - 0.2 10e3/uL    Absolute Immature Granulocytes 0.1 <=0.4 10e3/uL    Absolute NRBCs 0.0 10e3/uL   Lipid Panel     Status: Abnormal   Result Value Ref Range    Cholesterol 166 <200 mg/dL    Triglycerides 267 (H) <150 mg/dL    Direct Measure HDL 38 (L) >=40 mg/dL    LDL Cholesterol Calculated 75 <=100 mg/dL    Non HDL Cholesterol 128 <130 mg/dL    Narrative    Cholesterol  Desirable:  <200 mg/dL    Triglycerides  Normal:  Less than 150 mg/dL  Borderline High:  150-199 mg/dL  High:  200-499 mg/dL  Very High:  Greater than or equal to 500 mg/dL    Direct Measure HDL  Female:  Greater than or equal to 50 mg/dL   Male:  Greater than or equal to 40 mg/dL    LDL Cholesterol  Desirable:  <100mg/dL  Above Desirable:  100-129 mg/dL   Borderline High:  130-159 mg/dL   High:  160-189 mg/dL   Very High:  >= 190 mg/dL    Non HDL Cholesterol  Desirable:  130 mg/dL  Above Desirable:  130-159 mg/dL  Borderline High:  160-189 mg/dL  High:  190-219 mg/dL  Very High:  Greater than or equal to 220 mg/dL   EKG 12-lead, tracing only (Same Day)     Status: None (Preliminary result)   Result Value Ref Range    Systolic Blood Pressure  mmHg    Diastolic Blood Pressure  mmHg    Ventricular Rate 83 BPM    Atrial Rate 83 BPM    IN Interval 136 ms    QRS Duration 86 ms     ms    QTc 434 ms    P Axis 46 degrees    R AXIS 52 degrees    T Axis 38 degrees    Interpretation ECG       Sinus rhythm  Normal ECG  No previous ECGs available     CBC and Differential     Status: Abnormal    Narrative     The following orders were created for panel order CBC and Differential.  Procedure                               Abnormality         Status                     ---------                               -----------         ------                     CBC with platelets and d...[[id number]]  Abnormal            Final result                 Please view results for these tests on the individual orders.       Signed Electronically by: Chelsea Foster PA-C

## 2024-08-06 NOTE — LETTER
August 6, 2024      Myles Espinoza  [address] [place] [id number]        Dear ,    We are writing to inform you of your test results.    -White blood cell count is mildly elevated 11.9, attributed to tobacco use.  Hemoglobin normal at 16.1 and hematocrit 47.7  -TSH normal 1.06  -A1c returning at 6.0, this indicates prediabetes.  Recommend lifestyle modifications to help improve upon this. Recommend increased lean proteins, fruits and vegetable intake. Goal exercise 150 minutes of moderate exercise per week (walking is great exercise). Weight loss would benefit cardiopulmonary and overall health.  -Potassium normal 3.8, kidney function excellent with GFR 90 and creatinine 0.95.  AST and ALT (liver enzymes) are mildly elevated at 51 and 77 respectively.  I would recommend to recheck these in 4 weeks.  -Triglycerides are slightly elevated at 267, cholesterol and LDL are normal.  HDL mildly low at 38.  Exercise and lifestyle modifications are the best way to improve upon this.  -I have sent through a prescription for Hyzaar (losartan 50 mg and hydrochlorothiazide 12.5 mg) I would recommend to take this on a daily basis -typically in the morning timeframe as hydrochlorothiazide may cause increased urination slightly.  Recommend to monitor blood pressures at home.  I would like to see you back in the office in 4 weeks.    Resulted Orders   Comprehensive Metabolic Panel   Result Value Ref Range    Sodium 139 135 - 145 mmol/L    Potassium 3.8 3.4 - 5.3 mmol/L    Carbon Dioxide (CO2) 25 22 - 29 mmol/L    Anion Gap 9 7 - 15 mmol/L    Urea Nitrogen 14.9 6.0 - 20.0 mg/dL    Creatinine 0.95 0.67 - 1.17 mg/dL    GFR Estimate >90 >60 mL/min/1.73m2      Comment:      eGFR calculated using 2021 CKD-EPI equation.    Calcium 9.7 8.8 - 10.4 mg/dL      Comment:      Reference intervals for this test were updated on 7/16/2024 to reflect our healthy population more accurately. There may be differences in the flagging of prior  results with similar values performed with this method. Those prior results can be interpreted in the context of the updated reference intervals.    Chloride 105 98 - 107 mmol/L    Glucose 127 (H) 70 - 99 mg/dL    Alkaline Phosphatase 58 40 - 150 U/L    AST 51 (H) 0 - 45 U/L    ALT 77 (H) 0 - 70 U/L    Protein Total 7.2 6.4 - 8.3 g/dL    Albumin 4.1 3.5 - 5.2 g/dL    Bilirubin Total 0.2 <=1.2 mg/dL   TSH Reflex GH   Result Value Ref Range    TSH 1.06 0.30 - 4.20 uIU/mL   Hemoglobin A1c   Result Value Ref Range    Hemoglobin A1C 6.0 4.0 - 6.2 %   CBC with platelets and differential   Result Value Ref Range    WBC Count 11.9 (H) 4.0 - 11.0 10e3/uL    RBC Count 5.61 4.40 - 5.90 10e6/uL    Hemoglobin 16.1 13.3 - 17.7 g/dL    Hematocrit 47.7 40.0 - 53.0 %    MCV 85 78 - 100 fL    MCH 28.7 26.5 - 33.0 pg    MCHC 33.8 31.5 - 36.5 g/dL    RDW 12.3 10.0 - 15.0 %    Platelet Count 276 150 - 450 10e3/uL    % Neutrophils 59 %    % Lymphocytes 31 %    % Monocytes 7 %    % Eosinophils 2 %    % Basophils 0 %    % Immature Granulocytes 1 %    NRBCs per 100 WBC 0 <1 /100    Absolute Neutrophils 7.0 1.6 - 8.3 10e3/uL    Absolute Lymphocytes 3.8 0.8 - 5.3 10e3/uL    Absolute Monocytes 0.9 0.0 - 1.3 10e3/uL    Absolute Eosinophils 0.2 0.0 - 0.7 10e3/uL    Absolute Basophils 0.0 0.0 - 0.2 10e3/uL    Absolute Immature Granulocytes 0.1 <=0.4 10e3/uL    Absolute NRBCs 0.0 10e3/uL   Lipid Panel   Result Value Ref Range    Cholesterol 166 <200 mg/dL    Triglycerides 267 (H) <150 mg/dL    Direct Measure HDL 38 (L) >=40 mg/dL    LDL Cholesterol Calculated 75 <=100 mg/dL    Non HDL Cholesterol 128 <130 mg/dL    Narrative    Cholesterol  Desirable:  <200 mg/dL    Triglycerides  Normal:  Less than 150 mg/dL  Borderline High:  150-199 mg/dL  High:  200-499 mg/dL  Very High:  Greater than or equal to 500 mg/dL    Direct Measure HDL  Female:  Greater than or equal to 50 mg/dL   Male:  Greater than or equal to 40 mg/dL    LDL Cholesterol  Desirable:   <100mg/dL  Above Desirable:  100-129 mg/dL   Borderline High:  130-159 mg/dL   High:  160-189 mg/dL   Very High:  >= 190 mg/dL    Non HDL Cholesterol  Desirable:  130 mg/dL  Above Desirable:  130-159 mg/dL  Borderline High:  160-189 mg/dL  High:  190-219 mg/dL  Very High:  Greater than or equal to 220 mg/dL       If you have any questions or concerns, please call the clinic at the number listed above.       Sincerely,      Chelsea Foster PA-C

## 2024-08-06 NOTE — NURSING NOTE
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  Swelling present in both ankles.    FOOD SECURITY SCREENING QUESTIONS:    The next two questions are to help us understand your food security.  If you are feeling you need any assistance in this area, we have resources available to support you today.    Hunger Vital Signs:  Within the past 12 months we worried whether our food would run out before we got money to buy more. Never  Within the past 12 months the food we bought just didn't last and we didn't have money to get more. Never    Advance Care Directive on file? no  Advance Care Directive provided to patient? Declined.      Chief Complaint   Patient presents with    Hypertension    Swelling       Initial BP (!) 178/98 (BP Location: Left arm, Patient Position: Sitting, Cuff Size: Adult Large)   Pulse 88   Temp 98.7  F (37.1  C) (Tympanic)   Resp 20   Ht 1.765 m (5' 9.5\")   Wt 111.6 kg (246 lb)   BMI 35.81 kg/m   Estimated body mass index is 35.81 kg/m  as calculated from the following:    Height as of this encounter: 1.765 m (5' 9.5\").    Weight as of this encounter: 111.6 kg (246 lb).  Medication Reconciliation: complete        Joselin Hunter LPN     "

## 2025-05-11 ENCOUNTER — HOSPITAL ENCOUNTER (EMERGENCY)
Facility: OTHER | Age: 25
Discharge: HOME | End: 2025-05-11
Attending: PHYSICIAN ASSISTANT
Payer: COMMERCIAL

## 2025-05-11 VITALS
WEIGHT: 315 LBS | SYSTOLIC BLOOD PRESSURE: 231 MMHG | RESPIRATION RATE: 18 BRPM | DIASTOLIC BLOOD PRESSURE: 123 MMHG | HEART RATE: 103 BPM | TEMPERATURE: 98.1 F | OXYGEN SATURATION: 97 % | BODY MASS INDEX: 46.65 KG/M2 | HEIGHT: 69 IN

## 2025-05-11 DIAGNOSIS — I10 ASYMPTOMATIC HYPERTENSION: ICD-10-CM

## 2025-05-11 DIAGNOSIS — L02.821 BOIL OF HEAD OR SCALP: ICD-10-CM

## 2025-05-11 PROCEDURE — 99282 EMERGENCY DEPT VISIT SF MDM: CPT | Performed by: PHYSICIAN ASSISTANT

## 2025-05-11 PROCEDURE — 99283 EMERGENCY DEPT VISIT LOW MDM: CPT | Performed by: PHYSICIAN ASSISTANT

## 2025-05-11 RX ORDER — SULFAMETHOXAZOLE AND TRIMETHOPRIM 800; 160 MG/1; MG/1
1 TABLET ORAL 2 TIMES DAILY
Qty: 14 TABLET | Refills: 0 | Status: SHIPPED | OUTPATIENT
Start: 2025-05-11

## 2025-05-11 ASSESSMENT — COLUMBIA-SUICIDE SEVERITY RATING SCALE - C-SSRS
2. HAVE YOU ACTUALLY HAD ANY THOUGHTS OF KILLING YOURSELF IN THE PAST MONTH?: NO
6. HAVE YOU EVER DONE ANYTHING, STARTED TO DO ANYTHING, OR PREPARED TO DO ANYTHING TO END YOUR LIFE?: NO
1. IN THE PAST MONTH, HAVE YOU WISHED YOU WERE DEAD OR WISHED YOU COULD GO TO SLEEP AND NOT WAKE UP?: NO

## 2025-05-11 ASSESSMENT — ACTIVITIES OF DAILY LIVING (ADL): ADLS_ACUITY_SCORE: 41

## 2025-05-11 NOTE — ED TRIAGE NOTES
"Pt presents to ED via private car with c/o a \"bump\" on the back of his head. Pt isn't sure if he got bit by something or if it's a ingrown hair. Pt noticed swelling 2 days ago. BP (!) 212/133   Pulse 104   Temp 98.1  F (36.7  C) (Tympanic)   Resp 18   Ht 1.753 m (5' 9\")   Wt (!) 157.2 kg (346 lb 8 oz)   SpO2 97%   BMI 51.17 kg/m         Triage Assessment (Adult)       Row Name 05/11/25 1348          Triage Assessment    Airway WDL WDL        Respiratory WDL    Respiratory WDL WDL        Skin Circulation/Temperature WDL    Skin Circulation/Temperature WDL WDL        Cardiac WDL    Cardiac WDL WDL        Peripheral/Neurovascular WDL    Peripheral Neurovascular WDL WDL        Cognitive/Neuro/Behavioral WDL    Cognitive/Neuro/Behavioral WDL WDL                     "

## 2025-05-11 NOTE — DISCHARGE INSTRUCTIONS
I think you most likely have a boi/furuncle probably from an ingrown hair.  Currently,  I do not think you would benefit from an incision and drainage procedure, however continue to use warm compresses.  Will start you on antibiotics, take as directed.  A referral was placed for you to establish care and follow-up with PCP for wound reassessment and at that time it may become more of a situation where you may need incision and drainage procedure.  You should return to the ED if you are having increased fevers, rapidly spreading redness or swelling or in general becoming more ill.  You do have some higher blood pressures today but it sounds like you have not been taking your medications.  Be sure to start taking your blood pressure medications.

## 2025-05-15 ASSESSMENT — ENCOUNTER SYMPTOMS: WOUND: 1

## 2025-05-15 NOTE — ED PROVIDER NOTES
"  History     Chief Complaint   Patient presents with    Head Problem     HPI  Myles Espinoza is a 24 year old male who presents to the ED for evaluation of a head problem. Pt presents to ED via private car with c/o a \"bump\" on the back of his head. Pt isn't sure if he got bit by something or if it's a ingrown hair. Pt noticed swelling 2 days ago.     Allergies:  Allergies   Allergen Reactions    Liquid Adhesive Rash     Used with steri strips         Problem List:    Patient Active Problem List    Diagnosis Date Noted    Pneumonia 09/23/2018     Priority: Medium    Tonsillitis 09/23/2018     Priority: Medium    Diarrhea of presumed infectious origin 09/23/2018     Priority: Medium    Cellulitis of tonsil 09/21/2018     Priority: Medium    Tonsillar hypertrophy 09/21/2018     Priority: Medium    Dietary iron deficiency without anemia 03/05/2018     Priority: Medium    Essential hypertension 03/02/2018     Priority: Medium    Morbid obesity due to excess calories (H) 03/02/2018     Priority: Medium    Anxiety state 02/15/2018     Priority: Medium    Other pulmonary embolism without acute cor pulmonale, unspecified chronicity (H) 02/11/2018     Priority: Medium    Anticoagulation monitoring, INR range 2-3 02/11/2018     Priority: Medium    Tobacco chew use 07/13/2017     Priority: Medium    Hypertriglyceridemia 09/18/2015     Priority: Medium    Prediabetes 09/18/2015     Priority: Medium     Overview:   Referred to nutrition. Trudy Linder MD ....................  9/18/2015   5:50 PM       Vitamin D deficiency 09/18/2015     Priority: Medium    Dermatitis, atopic 04/27/2005     Priority: Medium        Past Medical History:    Past Medical History:   Diagnosis Date    Anxiety disorder     Attention-deficit hyperactivity disorder     Body mass index (BMI) of 40.0-44.9 in adult (H)     Major depressive disorder, single episode     Prediabetes     Pure hyperglyceridemia     Vitamin D deficiency        Past Surgical " "History:    No past surgical history on file.    Family History:    Family History   Problem Relation Age of Onset    Other - See Comments Mother         Obesity/Psychiatric illness,depression    Hypertension Mother         Hypertension    Other - See Comments Father         Hypokalemic/periodic paralysis/Obesity    Diabetes Father     Heart Disease Other         Heart Disease,Afib    Heart Disease Paternal Grandmother         Heart Disease,Mitral valve probs    Thyroid Disease Paternal Aunt         Thyroid Disease    Thyroid Disease Paternal Aunt         Thyroid Disease    Blood Disease No family hx of         Blood Disease,blood clot       Social History:  Marital Status:  Single [1]  Social History     Tobacco Use    Smoking status: Every Day     Current packs/day: 0.00     Types: Cigarettes     Last attempt to quit: 2018     Years since quittin.6    Smokeless tobacco: Former     Types: Chew    Tobacco comments:     thinking about it - has tried to stop   Vaping Use    Vaping status: Never Used   Substance Use Topics    Alcohol use: Yes     Alcohol/week: 0.0 standard drinks of alcohol     Comment: rare    Drug use: No        Medications:    losartan-hydrochlorothiazide (HYZAAR) 50-12.5 MG tablet  sulfamethoxazole-trimethoprim (BACTRIM DS) 800-160 MG tablet          Review of Systems   Skin:  Positive for wound.       Physical Exam   BP: (!) 212/133  Pulse: 104  Temp: 98.1  F (36.7  C)  Resp: 18  Height: 175.3 cm (5' 9\")  Weight: (!) 157.2 kg (346 lb 8 oz)  SpO2: 97 %      Physical Exam  Constitutional:       General: He is not in acute distress.     Appearance: He is well-developed. He is not ill-appearing, toxic-appearing or diaphoretic.   Cardiovascular:      Rate and Rhythm: Normal rate and regular rhythm.   Pulmonary:      Effort: Pulmonary effort is normal.      Breath sounds: Normal breath sounds.   Skin:     General: Skin is warm and dry.      Findings: Erythema and lesion present.      Comments: " Small dime size area of redness with a dark central scab. It is not fluctuant.   Neurological:      Mental Status: He is alert. Mental status is at baseline.   Psychiatric:         Mood and Affect: Mood normal.         Behavior: Behavior normal.         ED Course        Procedures              Critical Care time:  none           No results found for this or any previous visit (from the past 24 hours).    Medications - No data to display    Assessments & Plan (with Medical Decision Making)   Nontoxic and in NAD. He does have Small dime size area of redness with a dark central scab. It is not fluctuant. Does not appear painful to touch.     No indication for I&D at this time.     From discharge:  I think you most likely have a boi/furuncle probably from an ingrown hair.  Currently,  I do not think you would benefit from an incision and drainage procedure, however continue to use warm compresses.  Will start you on antibiotics, take as directed.  A referral was placed for you to establish care and follow-up with PCP for wound reassessment and at that time it may become more of a situation where you may need incision and drainage procedure.  You should return to the ED if you are having increased fevers, rapidly spreading redness or swelling or in general becoming more ill.  You do have some higher blood pressures today but it sounds like you have not been taking your medications.  Be sure to start taking your blood pressure medications.    Strict return precautions are given to the pt, they will return if symptoms are worsening or concerning. The pt understands and agrees with the plan and they are discharged.     Alejandro Floyd PA-C    I have reviewed the nursing notes.    I have reviewed the findings, diagnosis, plan and need for follow up with the patient.          Discharge Medication List as of 5/11/2025  4:08 PM        START taking these medications    Details   sulfamethoxazole-trimethoprim (BACTRIM DS)  800-160 MG tablet Take 1 tablet by mouth 2 times daily., Disp-14 tablet, R-0, [facility]             Final diagnoses:   Boil of head or scalp   Asymptomatic hypertension       5/11/2025   [facility] Alejandro, PA  05/15/25 4241

## 2025-06-15 ENCOUNTER — APPOINTMENT (OUTPATIENT)
Dept: GENERAL RADIOLOGY | Facility: OTHER | Age: 25
End: 2025-06-15
Attending: PHYSICIAN ASSISTANT
Payer: COMMERCIAL

## 2025-06-15 ENCOUNTER — HOSPITAL ENCOUNTER (EMERGENCY)
Facility: OTHER | Age: 25
Discharge: HOME | End: 2025-06-15
Attending: PHYSICIAN ASSISTANT
Payer: COMMERCIAL

## 2025-06-15 VITALS
SYSTOLIC BLOOD PRESSURE: 160 MMHG | OXYGEN SATURATION: 97 % | HEART RATE: 90 BPM | TEMPERATURE: 98 F | DIASTOLIC BLOOD PRESSURE: 68 MMHG | RESPIRATION RATE: 18 BRPM

## 2025-06-15 DIAGNOSIS — S90.414A ABRASION OF SECOND TOE OF RIGHT FOOT, INITIAL ENCOUNTER: ICD-10-CM

## 2025-06-15 DIAGNOSIS — S90.31XA CONTUSION OF RIGHT FOOT, INITIAL ENCOUNTER: ICD-10-CM

## 2025-06-15 DIAGNOSIS — S92.501A CLOSED FRACTURE OF PHALANX OF RIGHT SECOND TOE, INITIAL ENCOUNTER: ICD-10-CM
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
## 2025-06-15 DIAGNOSIS — S90.111A CONTUSION OF RIGHT GREAT TOE WITHOUT DAMAGE TO NAIL, INITIAL ENCOUNTER: ICD-10-CM

## 2025-06-15 DIAGNOSIS — S80.11XA CONTUSION OF RIGHT LEG, INITIAL ENCOUNTER: ICD-10-CM

## 2025-06-15 PROCEDURE — 73590 X-RAY EXAM OF LOWER LEG: CPT | Mod: RT

## 2025-06-15 PROCEDURE — 73590 X-RAY EXAM OF LOWER LEG: CPT | Mod: 26 | Performed by: RADIOLOGY

## 2025-06-15 PROCEDURE — 250N000009 HC RX 250: Performed by: PHYSICIAN ASSISTANT

## 2025-06-15 PROCEDURE — 90471 IMMUNIZATION ADMIN: CPT | Performed by: PHYSICIAN ASSISTANT

## 2025-06-15 PROCEDURE — 73630 X-RAY EXAM OF FOOT: CPT | Mod: RT

## 2025-06-15 PROCEDURE — 250N000011 HC RX IP 250 OP 636: Performed by: PHYSICIAN ASSISTANT

## 2025-06-15 PROCEDURE — 99283 EMERGENCY DEPT VISIT LOW MDM: CPT | Mod: 25 | Performed by: PHYSICIAN ASSISTANT

## 2025-06-15 PROCEDURE — 99284 EMERGENCY DEPT VISIT MOD MDM: CPT | Mod: 25

## 2025-06-15 PROCEDURE — 90715 TDAP VACCINE 7 YRS/> IM: CPT | Performed by: PHYSICIAN ASSISTANT

## 2025-06-15 PROCEDURE — 73630 X-RAY EXAM OF FOOT: CPT | Mod: 26 | Performed by: RADIOLOGY

## 2025-06-15 PROCEDURE — 12001 RPR S/N/AX/GEN/TRNK 2.5CM/<: CPT

## 2025-06-15 PROCEDURE — 250N000013 HC RX MED GY IP 250 OP 250 PS 637: Performed by: PHYSICIAN ASSISTANT

## 2025-06-15 PROCEDURE — 12001 RPR S/N/AX/GEN/TRNK 2.5CM/<: CPT | Performed by: PHYSICIAN ASSISTANT

## 2025-06-15 RX ORDER — OXYCODONE AND ACETAMINOPHEN 5; 325 MG/1; MG/1
1 TABLET ORAL ONCE
Refills: 0 | Status: COMPLETED | OUTPATIENT
Start: 2025-06-15 | End: 2025-06-15

## 2025-06-15 RX ORDER — CEPHALEXIN 500 MG/1
500 CAPSULE ORAL 2 TIMES DAILY
Qty: 30 CAPSULE | Refills: 0 | Status: SHIPPED | OUTPATIENT
Start: 2025-06-15 | End: 2025-06-18

## 2025-06-15 RX ORDER — GINSENG 100 MG
500 CAPSULE ORAL ONCE
Status: COMPLETED | OUTPATIENT
Start: 2025-06-15 | End: 2025-06-15

## 2025-06-15 RX ORDER — OXYCODONE AND ACETAMINOPHEN 5; 325 MG/1; MG/1
1 TABLET ORAL EVERY 6 HOURS PRN
Qty: 12 TABLET | Refills: 0 | Status: SHIPPED | OUTPATIENT
Start: 2025-06-15

## 2025-06-15 RX ORDER — LIDOCAINE HYDROCHLORIDE 10 MG/ML
10 INJECTION, SOLUTION INFILTRATION; PERINEURAL ONCE
Status: COMPLETED | OUTPATIENT
Start: 2025-06-15 | End: 2025-06-15

## 2025-06-15 RX ADMIN — LIDOCAINE HYDROCHLORIDE 10 ML: 10 INJECTION, SOLUTION EPIDURAL; INFILTRATION; INTRACAUDAL; PERINEURAL at 19:23

## 2025-06-15 RX ADMIN — CLOSTRIDIUM TETANI TOXOID ANTIGEN (FORMALDEHYDE INACTIVATED), CORYNEBACTERIUM DIPHTHERIAE TOXOID ANTIGEN (FORMALDEHYDE INACTIVATED), BORDETELLA PERTUSSIS TOXOID ANTIGEN (GLUTARALDEHYDE INACTIVATED), BORDETELLA PERTUSSIS FILAMENTOUS HEMAGGLUTININ ANTIGEN (FORMALDEHYDE INACTIVATED), BORDETELLA PERTUSSIS PERTACTIN ANTIGEN, AND BORDETELLA PERTUSSIS FIMBRIAE 2/3 ANTIGEN 0.5 ML: 5; 2; 2.5; 5; 3; 5 INJECTION, SUSPENSION INTRAMUSCULAR at 19:19

## 2025-06-15 RX ADMIN — OXYCODONE HYDROCHLORIDE AND ACETAMINOPHEN 1 TABLET: 5; 325 TABLET ORAL at 18:16

## 2025-06-15 RX ADMIN — CEPHALEXIN 500 MG: 250 CAPSULE ORAL at 19:20

## 2025-06-15 RX ADMIN — BACITRACIN 1 G: 500 OINTMENT TOPICAL at 19:24

## 2025-06-15 RX ADMIN — IBUPROFEN 600 MG: 200 TABLET, FILM COATED ORAL at 18:16

## 2025-06-15 ASSESSMENT — ACTIVITIES OF DAILY LIVING (ADL)
ADLS_ACUITY_SCORE: 41
ADLS_ACUITY_SCORE: 41

## 2025-06-15 ASSESSMENT — COLUMBIA-SUICIDE SEVERITY RATING SCALE - C-SSRS
1. IN THE PAST MONTH, HAVE YOU WISHED YOU WERE DEAD OR WISHED YOU COULD GO TO SLEEP AND NOT WAKE UP?: NO
6. HAVE YOU EVER DONE ANYTHING, STARTED TO DO ANYTHING, OR PREPARED TO DO ANYTHING TO END YOUR LIFE?: NO
2. HAVE YOU ACTUALLY HAD ANY THOUGHTS OF KILLING YOURSELF IN THE PAST MONTH?: NO

## 2025-06-15 NOTE — ED TRIAGE NOTES
Pt arrives with concerns of a right foot and toe injury after driving his mower over his foot. He was wearing shoes, and his shoe took most of it. His second and third right toes have lacerations, bleeding controlled on arrival. Lat tdap 2018.  BP (!) 182/135   Pulse 115   Temp 97.7  F (36.5  C) (Tympanic)   Resp 20   SpO2 96%       Triage Assessment (Adult)       Row Name 06/15/25 1400          Triage Assessment    Airway WDL WDL        Respiratory WDL    Respiratory WDL WDL        Skin Circulation/Temperature WDL    Skin Circulation/Temperature WDL X        Cardiac WDL    Cardiac WDL WDL        Peripheral/Neurovascular WDL    Peripheral Neurovascular WDL X        Cognitive/Neuro/Behavioral WDL    Cognitive/Neuro/Behavioral WDL WDL

## 2025-06-16 NOTE — ED PROVIDER NOTES
EMERGENCY DEPARTMENT ENCOUNTER      NAME: Myles Espinoza  AGE: 24 year old male  YOB: 2000  MRN: [id number]  EVALUATION DATE & TIME: 6/15/2025  5:46 PM    PCP: Vivek López    ED PROVIDER: Sean Wyatt PA-C       CHIEF COMPLAINT:  Chief Complaint   Patient presents with    Foot Injury    Laceration         HPI  Myles Espinoza is a pleasant 24 year old male who presents to the ER with his mother for evaluation of right foot injury.  Patient injured his foot with a lawnmower just prior to arrival.  Patient was wearing a shoe.  Slipped with the lumbar coming over the top of his foot.  Having an abrasion/laceration to the top of his right second toe.  Also having pain in his right great toe, foot and right shin.  Last tetanus shot was more than 5 years ago.  Moderate pain.  Worse with walking.  Better with sitting down.      REVIEW OF SYSTEMS   Review of Systems  As above, otherwise ROS is unremarkable.      PAST MEDICAL HISTORY:  Past Medical History:   Diagnosis Date    Anxiety disorder     anxiety NOS    Attention-deficit hyperactivity disorder     combined type,off medication as of 2013.    Body mass index (BMI) of 40.0-44.9 in adult (H)     9/9/2015,BMI of 44.    Major depressive disorder, single episode     No Comments Provided    Prediabetes     9/18/2015    Pure hyperglyceridemia     9/18/2015    Vitamin D deficiency     9/18/2015         PAST SURGICAL HISTORY:  No past surgical history on file.      CURRENT MEDICATIONS:    Current Outpatient Medications   Medication Instructions    amLODIPine (NORVASC) 5 mg, Oral, AT BEDTIME    losartan-hydrochlorothiazide (HYZAAR) 100-25 MG tablet 1 tablet, Oral, DAILY    losartan-hydrochlorothiazide (HYZAAR) 50-12.5 MG tablet 1 tablet, Oral, DAILY    oxyCODONE-acetaminophen (PERCOCET) 5-325 MG tablet 1 tablet, Oral, EVERY 6 HOURS PRN    triamcinolone (KENALOG) 0.1 % external cream Topical, 3 TIMES DAILY         ALLERGIES:  Allergies    Allergen Reactions    Liquid Adhesive Rash     Used with steri strips           FAMILY HISTORY:  Family History   Problem Relation Age of Onset    Other - See Comments Mother         Obesity/Psychiatric illness,depression    Hypertension Mother         Hypertension    Other - See Comments Father         Hypokalemic/periodic paralysis/Obesity    Diabetes Father     Heart Disease Other         Heart Disease,Afib    Heart Disease Paternal Grandmother         Heart Disease,Mitral valve probs    Thyroid Disease Paternal Aunt         Thyroid Disease    Thyroid Disease Paternal Aunt         Thyroid Disease    Blood Disease No family hx of         Blood Disease,blood clot         SOCIAL HISTORY:   Social History     Socioeconomic History    Marital status: Single   Tobacco Use    Smoking status: Every Day     Current packs/day: 0.00     Types: Cigarettes     Last attempt to quit: 2018     Years since quittin.7    Smokeless tobacco: Former     Types: Chew    Tobacco comments:     thinking about it - has tried to stop   Vaping Use    Vaping status: Never Used   Substance and Sexual Activity    Alcohol use: Yes     Comment: rarely    Drug use: No    Sexual activity: Not Currently   Social History Narrative    Attends [employer], 2015  Parents  .  Lives with his mother.   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     Social Drivers of Health     Interpersonal Safety: Low Risk  (2024)    Interpersonal Safety     Do you feel physically and emotionally safe where you currently live?: Yes     Within the past 12 months, have you been hit, slapped, kicked or otherwise physically hurt by someone?: No     Within the past 12 months, have you been humiliated or emotionally abused in other ways by your partner or ex-partner?: No        ==================================================================================================================================    PHYSICAL EXAM    VITAL SIGNS: BP (!) 182/135   Pulse 115   Temp 97.7  F (36.5  C) (Tympanic)   Resp 20   SpO2 96%     Patient Vitals for the past 24 hrs:   BP Temp Temp src Pulse Resp SpO2   06/15/25 1743 (!) 182/135 97.7  F (36.5  C) Tympanic 115 20 96 %       Physical Exam  Vitals and nursing note reviewed.   Constitutional:       General: Alert and active.  In no acute distress.  HENT:      Nose: Nose normal.      Mouth/Throat:      Mouth: Mucous membranes are moist.      Pharynx: Oropharynx is clear.   Eyes:      Conjunctiva/sclera: Conjunctivae normal.   Musculoskeletal:     Right knee: Nontender.  Flocked range of motion.    Right leg: Bruising to the anterior/medial portion of his right leg.  No open wound.  Positive tenderness to the right anterior leg.    Right foot/ankle: Tenderness and bruising to the dorsum of the right foot.  Bruising, swelling, and tenderness of the right great toe without nail involvement.  Abrasion to the dorsum of the right great toe over the distal segment.  No nail injury.  A very small puncture wound with bleeding otherwise no active bleeding.  Toes 3 through 5 unaffected.  Brisk cap refill with no signs of neurovascular compromise.    Skin:     General: Skin is warm and dry.   Neurological:      General: No focal deficit present.      Mental Status: Alert and oriented for age.   Psychiatric:         Mood and Affect: Mood normal.     ==================================================================================================================================    LABS & RADIOLOGY:  All pertinent labs reviewed and interpreted. Reviewed all pertinent imaging. Please see official radiology report.  Results for orders placed or performed during the hospital encounter of 06/15/25   XR Foot Port Right 3 Views    Impression    IMPRESSION:  Comminuted minimally displaced intra-articular fracture of the second distal phalanx. Otherwise, normal joint spaces and alignment.    XR Tibia & Fibula Port Right 2 Views    Impression    IMPRESSION: Within normal limits. No acute fracture.      XR Tibia & Fibula Port Right 2 Views   Final Result   IMPRESSION: Within normal limits. No acute fracture.       XR Foot Port Right 3 Views   Final Result   IMPRESSION: Comminuted minimally displaced intra-articular fracture of the second distal phalanx. Otherwise, normal joint spaces and alignment.             PROCEDURES:   INFORMED CONSENT  Discussed the risks, benefits, alternatives, and the necessity of other members of the [facility] team participating in the procedure. All questions answered and informed consent obtained.    UNIVERSAL PROTOCOL  Procedural pause conducted to verify: Correct patient identity, procedure to be performed, and as applicable, correct side and site, correct patient position, and availability of implants, special equipment, or special requirements.    [facility]    -Laceration Repair    Date/Time: 6/15/2025 7:48 PM    Performed by: Sean Wyatt PA-C  Authorized by: Sean Wyatt PA-C    Risks, benefits and alternatives discussed.      ANESTHESIA (see MAR for exact dosages):     Anesthesia method: Digital blocks of the right great toe and right second toe were conducted using 3 cc of 1% lidocaine without epinephrine each for total 6 cc with good local anesthesia obtained for the right great toe and right second toe.  LACERATION DETAILS     Location: Right second toe.    Length (cm):  0.1    REPAIR TYPE:     Repair type:  Simple    EXPLORATION:     Wound exploration: entire depth of wound probed and visualized      Wound extent: underlying fracture      Wound extent: fascia not violated, no nerve damage, no tendon damage and no vascular damage      Contaminated: no      TREATMENT:     Area cleansed  with:  Hibiclens    Amount of cleaning:  Standard    Irrigation solution:  Sterile saline    SKIN REPAIR     Repair method:  Sutures    Suture size:  5-0    Suture material:  Fast-absorbing gut    Suture technique:  Figure eight    Number of sutures:  1    POST-PROCEDURE DETAILS     Dressing:  Antibiotic ointment      PROCEDURE  Describe Procedure: No significant laceration.  A figure-of-eight suture was placed to stop a small amount of venous bleeding.  Patient Tolerance:  Patient tolerated the procedure well with no immediate complications      ==================================================================================================================================    ED COURSE, MEDICAL DECISION MAKING, FINAL IMPRESSION AND PLAN:     Assessment / Plan:  1. Closed fracture of phalanx of right second toe, initial encounter    2. Abrasion of second toe of right foot, initial encounter    3. Contusion of right great toe without damage to nail, initial encounter    4. Contusion of right foot, initial encounter    5. Contusion of right leg, initial encounter    6. Contact with powered lawnmower as cause of accidental injury, initial encounter        The patient was interviewed and examined.  HPI and physical exam as below.  Differential diagnosis and MDM Key Documentation Elements as below.  Vitals, triage note, and nursing notes were reviewed.  BP (!) 182/135   Pulse 115   Temp 97.7  F (36.5  C) (Tympanic)   Resp 20   SpO2 96%     Patient is afebrile elevated blood pressure.  Patient was in discomfort from pain.  Patient is tachycardic.  Patient with contusions to the right leg, right foot, and right great toe.  Patient with a closed, comminuted, intra-articular fracture of the distal phalanx of the right second toe.  Patient with a small bleeding site to the dorsum of the right second toe with no articulation with the underlying bone.  A simple figure 8 suture was placed.  - Patient was placed into a  "postop shoe.  Crutches were offered but patient states that he does have a pair of crutches at home.  - Tetanus shot was updated.  - Patient was given ibuprofen and Percocet here in the ER with improvement in pain.  - Plan is for patient to wear a postop shoe and ambulate with use of crutches.  No weightbearing until patient sees podiatry.  Podiatry referral entered.  Patient to use Percocet at home for pain.  Patient was given Keflex 500 mg here in the ER out of abundance of caution.  He may take Keflex 500 mg twice daily for 3 days to prevent secondary infection.  Work slip was given.  Wound care discussed.  Signs/symptoms infection discussed.  Patient discharged home in stable condition.    Pertinent Labs & Imaging studies reviewed. (See chart for details)  Results for orders placed or performed during the hospital encounter of 06/15/25   XR Foot Port Right 3 Views    Impression    IMPRESSION: Comminuted minimally displaced intra-articular fracture of the second distal phalanx. Otherwise, normal joint spaces and alignment.    XR Tibia & Fibula Port Right 2 Views    Impression    IMPRESSION: Within normal limits. No acute fracture.      No results found for: \"ABORH\"      Reassessments, Medications, Interventions, & Response to Treatments:  -as above    Medications given during today's ER visit:  Medications   ibuprofen (ADVIL/MOTRIN) tablet 600 mg (600 mg Oral $Given 6/15/25 1816)   oxyCODONE-acetaminophen (PERCOCET) 5-325 MG per tablet 1 tablet (1 tablet Oral $Given 6/15/25 1816)   Tdap (tetanus-diphtheria-acell pertussis) (ADACEL) injection 0.5 mL (0.5 mLs Intramuscular $Given 6/15/25 1919)   lidocaine 1 % injection 10 mL (10 mLs Intradermal $Given 6/15/25 1923)   bacitracin ointment 1 g (1 g Topical $Given 6/15/25 1924)   cephALEXin (KEFLEX) capsule 500 mg (500 mg Oral $Given 6/15/25 1920)       Consultations:  None    Decision Rules, Medical Calculators, Sepsis Criteria, and Risk Stratification " Tools:  None    MDM Key Documentation Elements for Patient's Evaluation:  Differential diagnosis to include high risk considerations: As above  Escalation to admission/observation considered: Admission/observation considered, but patient does not meet admission criteria  Discussions and management with other clinicians:    3a. Independent interpretation of testing performed by another health professional:  -No  3b. Discussion of management or test interpretation with another health professional: -No  Independent interpretation of tests:  Ordering and/or review of 2 test(s)  Discussion of test interpretations with radiology:  No  History obtained from source other than patient or assessment requiring an independent historian:  No  Review of non-ED/external records:  review of 1 records  Diagnostic tests considered but not ultimately performed/deferred:  -CT right foot  Prescription medications considered but not prescribed:  -Bactrim  Chronic conditions affecting care:  -Obesity  Care affected by social determinants of health:  -None    The patient's management involved:   - Imaging studies  - Prescription drug management  - Decision regarding minor procedures (fracture care without reduction)    A shared decision making model was used. Time was taken to answer all questions.  Patient and/or associated parties understood and were agreeable to treatment plan.  Plan and all results were discussed. Warning signs and close return precautions to return to the ED given. Copy of results given. Discharged in stable condition. Discharged with discharge instructions outlining plan for further care and follow up.      New prescriptions started at today's ER visit  New Prescriptions    CEPHALEXIN (KEFLEX) 500 MG CAPSULE    Take 1 capsule (500 mg) by mouth 2 times daily for 3 days.    OXYCODONE-ACETAMINOPHEN (PERCOCET) 5-325 MG TABLET    Take 1 tablet by mouth every 6 hours as needed for pain.        ==================================================================================================================================      Corky PERALTA PA-C, personally performed the services described in this documentation, and it is both accurate and complete.       Sean Wyatt PA-C  06/15/25 1952

## 2025-06-16 NOTE — DISCHARGE INSTRUCTIONS
- Recommend close follow-up with podiatry, referral entered.  Please call tomorrow to schedule appointment.  - Use Percocet for pain.  Keep foot elevated and use ice to help minimize pain and swelling.  - Your tetanus shot was updated.  - Keep wound clean. You may shower normally.  No baths.  No swimming in lakes, rivers, oceans, hot tubs, or pools until wound is completely healed.  - Gently wash with soap and water, apply antibiotic ointment like petroleum jelly/bacitracin, and then apply new dressing/Band-Aid twice a day.  Do not allow the wound to dry out.  - You may shower normally.  - Return to the ED for any signs of infection to include increasing redness, increasing swelling, increasing pain, discharge, fever, or any other new or worsening symptoms.   - Sutures will fall out on their own about 5-7 days. You were given 1 suture today.

## 2025-06-19 ENCOUNTER — OFFICE VISIT (OUTPATIENT)
Dept: ORTHOPEDICS | Facility: OTHER | Age: 25
End: 2025-06-19
Attending: PHYSICIAN ASSISTANT
Payer: COMMERCIAL

## 2025-06-19 VITALS — OXYGEN SATURATION: 96 % | HEART RATE: 92 BPM

## 2025-06-19 DIAGNOSIS — S92.501A CLOSED FRACTURE OF PHALANX OF RIGHT SECOND TOE, INITIAL ENCOUNTER: ICD-10-CM
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
## 2025-06-19 ASSESSMENT — PAIN SCALES - GENERAL: PAINLEVEL_OUTOF10: MODERATE PAIN (4)

## 2025-06-19 NOTE — PROGRESS NOTES
SUBJECTIVE:  Myles is a new patient see me.  He has his toe injured with a lawnmower.  He has a distal phalanx fracture and abrasion to the dorsal aspect it was treated as an open fracture in the ER flushed and closed.  Fairly heather considering the injury.    ROS: Musculoskeletal and general review of systems are negative, per review of previous clinic questionnaire.  Denies SOB and calf pain.    EXAM:   PHYSICAL EXAMINATION:   CONSTITUTIONAL:  The patient is alert and oriented x 3, well appearing and in no apparent distress.  Affect is pleasant and answers questions appropriately.  VASCULAR:  Circulation is intact with palpable pedal pulses and adequate capillary fill time to all digits.  Hair growth is present and appropriate to mid foot and digits. Calf nontender.  NEUROLOGIC:  Light touch sensation is intact to digits.  There is a negative Tinel sign.    INTEGUMENT:  No abnormal dermatologic lesions are noted.  Skin has normal texture and turgor.    MUSCULOSKELETAL: Foot evaluated his right second toe has a dorsal lateral abrasion at the IP joint with no clear laceration here.  It was repaired with nonabsorbable suture and looks good there is no concerns for infection he is on an antibiotic.    IMAGING: X-rays demonstrate a distal fracture of the distal phalanx second toe relatively nondisplaced    ASSESSMENT: Open second toe fracture treated appropriate in the ER    PLAN OF CARE: Discussed condition and treatment patient today this looks good I really do not need to add anything in terms of treatment.  He is can keep an eye out for infection I did discuss his antibiotic he should take this for the next week.  He will follow-up with me as needed.    Hung Bales DPM

## 2025-06-26 ENCOUNTER — OFFICE VISIT (OUTPATIENT)
Dept: ORTHOPEDICS | Facility: OTHER | Age: 25
End: 2025-06-26
Attending: PODIATRIST
Payer: COMMERCIAL

## 2025-06-26 DIAGNOSIS — M25.571 RIGHT ANKLE PAIN, UNSPECIFIED CHRONICITY: ICD-10-CM

## 2025-06-26 DIAGNOSIS — S92.501A CLOSED FRACTURE OF PHALANX OF RIGHT SECOND TOE, INITIAL ENCOUNTER: Primary | ICD-10-CM

## 2025-06-26 NOTE — PROGRESS NOTES
SUBJECTIVE:  Is known to me he had a lawnmower injury and abrasion on his second toe repaired in the ER no signs of infection he still has swelling to his lower extremities and pain to his foot wants this evaluated today.    ROS: Musculoskeletal and general review of systems are negative, per review of previous clinic questionnaire.  Denies SOB and calf pain.    EXAM:   PHYSICAL EXAMINATION:   CONSTITUTIONAL:  The patient is alert and oriented x 3, well appearing and in no apparent distress.  Affect is pleasant and answers questions appropriately.  VASCULAR:  Circulation is intact with palpable pedal pulses and adequate capillary fill time to all digits.  Hair growth is present and appropriate to mid foot and digits. Calf nontender.  NEUROLOGIC:  Light touch sensation is intact to digits.  There is a negative Tinel sign.    INTEGUMENT:  No abnormal dermatologic lesions are noted.  Skin has normal texture and turgor.    MUSCULOSKELETAL: Right foot and ankle evaluated swelling is appreciated he is tender to palpate sinus tarsi lateral ankle and some through his midfoot.  No Pain with calf squeeze.    IMAGING: X-rays of foot and ankle weightbearing demonstrate no obvious acute osseous pathology.    ASSESSMENT: Midfoot ankle sprain associated with a lawnmower injury second toe injury as described previously    PLAN OF CARE: Discussed progression of treatment.  Discussed signs and symptoms of a blood clot he has swelling from an injury I do not think he has a blood clot at this time he has no pain with calf squeeze.  I did discussed these signs and symptoms with any worsening he will appoint through the ER.  Otherwise believe these are sprained some of this is due to inactivity since the injury he will get going on it follow-up with me with ongoing pain.    Hung Bales DPM

## (undated) RX ORDER — IBUPROFEN 400 MG/1
TABLET, FILM COATED ORAL
Status: DISPENSED
Start: 2025-06-15

## (undated) RX ORDER — DEXAMETHASONE SODIUM PHOSPHATE 4 MG/ML
INJECTION, SOLUTION INTRA-ARTICULAR; INTRALESIONAL; INTRAMUSCULAR; INTRAVENOUS; SOFT TISSUE
Status: DISPENSED
Start: 2018-09-20

## (undated) RX ORDER — LIDOCAINE HYDROCHLORIDE 10 MG/ML
INJECTION, SOLUTION EPIDURAL; INFILTRATION; INTRACAUDAL; PERINEURAL
Status: DISPENSED
Start: 2025-06-15

## (undated) RX ORDER — SODIUM CHLORIDE 9 MG/ML
INJECTION, SOLUTION INTRAVENOUS
Status: DISPENSED
Start: 2018-09-20

## (undated) RX ORDER — SODIUM CHLORIDE 9 MG/ML
INJECTION, SOLUTION INTRAVENOUS
Status: DISPENSED
Start: 2018-09-22

## (undated) RX ORDER — CLINDAMYCIN PHOSPHATE 900 MG/50ML
INJECTION, SOLUTION INTRAVENOUS
Status: DISPENSED
Start: 2018-09-20

## (undated) RX ORDER — SODIUM CHLORIDE AND POTASSIUM CHLORIDE 150; 900 MG/100ML; MG/100ML
INJECTION, SOLUTION INTRAVENOUS
Status: DISPENSED
Start: 2018-09-23

## (undated) RX ORDER — IBUPROFEN 200 MG
TABLET ORAL
Status: DISPENSED
Start: 2025-06-15

## (undated) RX ORDER — OXYCODONE AND ACETAMINOPHEN 5; 325 MG/1; MG/1
TABLET ORAL
Status: DISPENSED
Start: 2025-06-15

## (undated) RX ORDER — ACETAMINOPHEN 325 MG/1
TABLET ORAL
Status: DISPENSED
Start: 2018-09-23

## (undated) RX ORDER — GINSENG 100 MG
CAPSULE ORAL
Status: DISPENSED
Start: 2025-06-15

## (undated) RX ORDER — ACETAMINOPHEN 500 MG
TABLET ORAL
Status: DISPENSED
Start: 2018-09-20